# Patient Record
Sex: FEMALE | Race: BLACK OR AFRICAN AMERICAN | Employment: OTHER | URBAN - METROPOLITAN AREA
[De-identification: names, ages, dates, MRNs, and addresses within clinical notes are randomized per-mention and may not be internally consistent; named-entity substitution may affect disease eponyms.]

---

## 2018-07-06 ENCOUNTER — HISTORICAL (OUTPATIENT)
Dept: RADIOLOGY | Facility: HOSPITAL | Age: 78
End: 2018-07-06

## 2018-07-06 LAB — POC CREATININE: 0.8 MG/DL (ref 0.6–1.3)

## 2020-02-28 ENCOUNTER — TELEPHONE (OUTPATIENT)
Dept: ELECTROPHYSIOLOGY | Facility: CLINIC | Age: 80
End: 2020-02-28

## 2020-02-28 DIAGNOSIS — I49.8 OTHER SPECIFIED CARDIAC ARRHYTHMIAS: Primary | ICD-10-CM

## 2020-02-28 NOTE — TELEPHONE ENCOUNTER
Spoke with pt to schedule appt to see SK on 4/15/2020.  JAYLEEN at 8:25 and Dr. Waite will see pt at 9:20.

## 2020-03-11 ENCOUNTER — TELEPHONE (OUTPATIENT)
Dept: ELECTROPHYSIOLOGY | Facility: CLINIC | Age: 80
End: 2020-03-11

## 2020-03-11 NOTE — TELEPHONE ENCOUNTER
L/m for pt asking if she would like to r/s to see SK on 4/1/2020 instead of waiting until 4/15/2020.

## 2020-04-13 ENCOUNTER — TELEPHONE (OUTPATIENT)
Dept: ELECTROPHYSIOLOGY | Facility: CLINIC | Age: 80
End: 2020-04-13

## 2020-04-13 NOTE — TELEPHONE ENCOUNTER
Spoke with pt who does not have any concerns, as she is asymptomatic.  She recently saw her PCP, Dr. Duarte, who advised that she go to the [facility] ED due to 2nd degree AV block.  The pt was also referred to EP.  I informed the pt that Dr. Waite is not here this week, but once I get her records, I will talk to him to determine if she needs to be seen soon by virtual visit, or is she could wait to see Dr. Waite once he is seeing pts in clinic again.

## 2020-05-05 ENCOUNTER — TELEPHONE (OUTPATIENT)
Dept: ELECTROPHYSIOLOGY | Facility: CLINIC | Age: 80
End: 2020-05-05

## 2020-05-06 ENCOUNTER — TELEPHONE (OUTPATIENT)
Dept: ELECTROPHYSIOLOGY | Facility: CLINIC | Age: 80
End: 2020-05-06

## 2020-05-06 NOTE — TELEPHONE ENCOUNTER
Spoke with pt who informed me that her son is currently in [place] and would like for her to wait to come in once he is around and COVID is no longer a concern.  She will call us to schedule appt once she knows what her son's schedule will be.  ----- Message from Meme Espinosa sent at 5/6/2020 10:43 AM CDT -----  Contact: patient call      ----- Message -----  From: Mary Luna MA  Sent: 5/6/2020   8:44 AM CDT  To: [facility] Staff    The patient is returning Jeanna phone call please call [phone]. Thank you.

## 2020-06-03 ENCOUNTER — TELEPHONE (OUTPATIENT)
Dept: ELECTROPHYSIOLOGY | Facility: CLINIC | Age: 80
End: 2020-06-03